# Patient Record
Sex: MALE | Race: BLACK OR AFRICAN AMERICAN
[De-identification: names, ages, dates, MRNs, and addresses within clinical notes are randomized per-mention and may not be internally consistent; named-entity substitution may affect disease eponyms.]

---

## 2022-06-21 ENCOUNTER — HOSPITAL ENCOUNTER (EMERGENCY)
Dept: HOSPITAL 97 - ER | Age: 57
Discharge: HOME | End: 2022-06-21
Payer: SELF-PAY

## 2022-06-21 DIAGNOSIS — I10: ICD-10-CM

## 2022-06-21 DIAGNOSIS — K21.9: ICD-10-CM

## 2022-06-21 DIAGNOSIS — F17.210: ICD-10-CM

## 2022-06-21 DIAGNOSIS — Z88.0: ICD-10-CM

## 2022-06-21 DIAGNOSIS — D50.9: Primary | ICD-10-CM

## 2022-06-21 LAB
ALBUMIN SERPL BCP-MCNC: 3.6 G/DL (ref 3.4–5)
ALP SERPL-CCNC: 59 U/L (ref 45–117)
ALT SERPL W P-5'-P-CCNC: 30 U/L (ref 12–78)
ANISOCYTOSIS BLD QL: (no result)
AST SERPL W P-5'-P-CCNC: 27 U/L (ref 15–37)
BUN BLD-MCNC: 13 MG/DL (ref 7–18)
FERRITIN SERPL-MCNC: 3.3 NG/ML (ref 26–388)
GLUCOSE SERPLBLD-MCNC: 95 MG/DL (ref 74–106)
HCT VFR BLD CALC: 23 % (ref 39.6–49)
HYPOCHROMIA BLD QL: (no result)
INR BLD: 1.08
IRON SERPL-MCNC: 14 UG/DL (ref 65–175)
LYMPHOCYTES # SPEC AUTO: 3.1 K/UL (ref 0.7–4.9)
MORPHOLOGY BLD-IMP: (no result)
PMV BLD: 8.3 FL (ref 7.6–11.3)
POTASSIUM SERPL-SCNC: 3.7 MMOL/L (ref 3.5–5.1)
RBC # BLD: 3.99 M/UL (ref 4.33–5.43)
RBC # BLD: 4.02 M/UL (ref 4.33–5.43)
TRANSFERRIN SERPL-MCNC: 296 MG/DL (ref 200–360)
TROPONIN I SERPL HS-MCNC: 8.8 PG/ML (ref ?–58.9)

## 2022-06-21 PROCEDURE — 85025 COMPLETE CBC W/AUTO DIFF WBC: CPT

## 2022-06-21 PROCEDURE — 84466 ASSAY OF TRANSFERRIN: CPT

## 2022-06-21 PROCEDURE — 86901 BLOOD TYPING SEROLOGIC RH(D): CPT

## 2022-06-21 PROCEDURE — 84484 ASSAY OF TROPONIN QUANT: CPT

## 2022-06-21 PROCEDURE — 83540 ASSAY OF IRON: CPT

## 2022-06-21 PROCEDURE — 93005 ELECTROCARDIOGRAM TRACING: CPT

## 2022-06-21 PROCEDURE — 85044 MANUAL RETICULOCYTE COUNT: CPT

## 2022-06-21 PROCEDURE — 80053 COMPREHEN METABOLIC PANEL: CPT

## 2022-06-21 PROCEDURE — 36415 COLL VENOUS BLD VENIPUNCTURE: CPT

## 2022-06-21 PROCEDURE — 82746 ASSAY OF FOLIC ACID SERUM: CPT

## 2022-06-21 PROCEDURE — 86850 RBC ANTIBODY SCREEN: CPT

## 2022-06-21 PROCEDURE — 86900 BLOOD TYPING SEROLOGIC ABO: CPT

## 2022-06-21 PROCEDURE — 82607 VITAMIN B-12: CPT

## 2022-06-21 PROCEDURE — 82272 OCCULT BLD FECES 1-3 TESTS: CPT

## 2022-06-21 PROCEDURE — 85610 PROTHROMBIN TIME: CPT

## 2022-06-21 PROCEDURE — 82728 ASSAY OF FERRITIN: CPT

## 2022-06-21 NOTE — XMS REPORT
Continuity of Care Document

                            Created on:2022



Patient:JUAN C HAMMOND

Sex:Male

:1965

External Reference #:[id number]





Demographics







                          Address                   [address]



                                                    [place] [address]

 

                          Home Phone                [phone]

 

                          Email Address             [email or web address]

 

                          Preferred Language        English

 

                          Marital Status            Unknown

 

                          Zoroastrianism Affiliation     Unknown

 

                          Race                      Unknown

 

                          Additional Race(s)        Unavailable

 

                          Ethnic Group              Unknown









Author







                          Organization              [facility]

t

 

                          Address                   [address]



                                                    [place] [address]

 

                          Phone                     [phone]









Support







                Name            Relationship    Address         Phone

 

                MARILEE CORONA              [address] [phone]



                                                [place] [address] 

 

                NADER HOWARD              Unavailable     [phone]









Care Team Providers







                    Name                Role                Phone

 

                    MADELYN  Attending Clinician Unavailable

 

                    Desktop             Attending Clinician Unavailable

 

                    Dawit Parisi          Attending Clinician [phone]

 

                    KWAN Howard         Attending Clinician Unavailable

 

                    MARIELENA JEFFRIES      Attending Clinician Unavailable

 

                    BÁRBARA             Attending Clinician Unavailable









Payers







           Payer Name Policy Type Policy Number Effective Date Expiration Date S

ource







Problems







       Condition Condition Condition Status Onset  Resolution Last   Treating Co

mments 

Source



       Name   Details Category        Date   Date   Treatment Clinician        



                                                 Date                 

 

       Lumbar Lumbar Problem Active                                    [facility]



       pain   pain                                                    [facility]ici



                                                                      ans

 

       T11    T11    Problem Active                                    [facility]



       vertebral vertebral                                                  Phys

ici



       fracture fracture                                                  ans

 

       Open   Open   Problem Active                                    [facility]



       fracture fracture                                                  Physic

i



       of     of                                                      ans



       eleventh eleventh                                                  



       thoracic thoracic                                                  



       vertebra vertebra                                                  



       with   with                                                    



       routine routine                                                  



       healing, healing,                                                  



       unspecifie unspecifie                                                  



       d fracture d fracture                                                  



       morphology morphology                                                  



       ,      ,                                                       



       subsequent subsequent                                                  



       encounter encounter                                                  

 

       Closed Closed Problem Active                                    [facility]



       compressio compressio                                                  Ph

ysici



       n fracture n fracture                                                  an

s



       of L1  of L1                                                   



       lumbar lumbar                                                  



       vertebra vertebra                                                  



       with   with                                                    



       routine routine                                                  



       healing, healing,                                                  



       subsequent subsequent                                                  



       encounter encounter                                                  







Allergies, Adverse Reactions, Alerts

This patient has no known allergies or adverse reactions.



Social History







           Social Habit Start Date Stop Date  Quantity   Comments   Source

 

           time of call 2020 1:12            [facility] 

[facility]



                      13:12:44   13:12:44   PM                    [facility]







Medications







       Ordered Filled Start  Stop   Current Ordering Indication Dosage Frequency

 Signature

                    Comments            Components          Source



     Medication Medication Date Date Medication? Clinician                (SIG) 

          



     Name Name                                                   

 

     Duexis Duexis 2019-0      Yes  HEATHER      1    Q0.3333D TAKE 1           [facility]



     800-26.6 -26.6 MG 5-22           PRASARN                TABLET 3     

      Physici



     Oral Tablet Oral Tablet 00:00:           M.D.                TIMES         

  ans



               00                                 DAILY           

 

     Cyclobenzap Cyclobenzap 2019-0      Yes  HEATHER      1    Q8H  TAKE 1        

   [facility]



     rine HCl - rine HCl - 1-02           PRASARN                TABLET         

  Physici



     10 MG Oral 10 MG Oral 00:00:           M.D.                EVERY 8         

  ans



     Tablet Tablet 00                                 HOURS PRN           



                                                  muscle           



                                                  spasm           







Procedures







                Procedure       Date / Time Performed Performing Clinician [facility]

e

 

                [U] XRAY THORACOLUMBAR SPINE 2019 00:00:00                

 [facility]



                AP AND LAT. [id number]                                 

 

                [U] XRAY THORACOLUMBAR SPINE 2019 00:00:00                

 [facility]



                AP AND LAT. [id number]                                 

 

                [U] XRAY THORACOLUMBAR SPINE 2019 00:00:00                

 [facility]



                AP AND LAT. [id number]                                 

 

                [U] XRAY THORACOLUMBAR SPINE 2019 00:00:00                

 [facility]



                AP AND LAT. [id number]                                 

 

                Physical Therapy 2019 00:00:00                 [facility]

ns

 

                [U] XRAY THORACOLUMBAR SPINE 2018 00:00:00                

 [facility]



                AP AND LAT. [id number]                                 







Encounters







        Start   End     Encounter Admission Attending Care    Care    Encounter 

Source



        Date/Time Date/Time Type    Type    Clinicians Facility Department ID   

   

 

        2021-10-14         Outpatient                 [facility]     [id number]

 [facility]



        17:01:02                                                 [id number]   [facility]

 

        2020 Emergency E       [facility]    [facility]    750[address]

    [facility]



        17:25:00 21:47:00                 JENISE VENTURA                           

 

        2020 Office          Desktop, [facility] Triage Nurse [facility]     

[facility]  Encounter/ 

[facility]



        00:00:00 00:00:00 Visit           Kassandra Marinelli         [facility] 

[phone] [facility]



                                        Karie Howard [facility]  [address]

[address]  [facility]         Health



                                                        Contact         



                                                        Center          

 

        2020 Emergency E       MARI JEFFRIES    [facility]    7502   

 [facility]



        15:27:00 17:10:00                 NATHALY                          

 

        2019 Cash GRANGER [facility]     Orthopedics 58

[id number] [place]



        08:15:00 08:15:00 t;              ISAI ROMERO         at [facility]         

Ion GRANGER,                         Sports          ans



                        HEATHER, M.D.                         [facility]          

 

        2019 [facility]         [facility]AR[facility]UST     Orthopedics 50

[id number] [place]



        08:15:00 08:15:00 t;              ISAI ROMERO         at [facility]



                        trish GRANGER M.D.                                         

 

        2019 [facility]     Orthopedics 50

[id number] [place]



        07:30:00 07:30:00 t;              [facility]                                         

 

        2019 [facility]         [facility]     Orthopedics 48

[id number] [place]



        13:45:00 13:45:00 t;              ISA[facility] ROMERO         [facility]                                         

 

        2019 [facility]         [facility]     Orthopedics 47

[id number] [place]



        10:15:00 10:15:00 t;              ISAI ROMERO         [facility]                                         

 

        2018 [facility]     [id number]

35 [place]



        09:30:00 09:30:00 t;              ISAI ROMERO                         [facility]



                        trish GRANGER M.D.                                         

 

        2018 Emergency E               [facility]    [facility]    7501    

[facility]



        13:35:00 13:35:00                                                 







Results







           Test Description Test Time  Test Comments Results    Result Comments 

Source









                    CBC W/AUTO DIFF WITH PLATELETS 2022 08:35:36 









                      Test Item  Value      Reference Range Interpretation Comme

nts









             WBC (test code = 1001) 6.1 K/UL     3.5-11.0                  

 

             RBC (test code = 1002) 4.20 M/UL    4.50-6.10    L            

 

             HEMOGLOBIN (test code = 6.8 G/DL     13.5-17.0    LL           



             1003)                                               RESULTS RECHECK

ED AND



                                                                 VERIFIED

 

             HEMATOCRIT (test code = 26.1 %       40.0-51.0    L            



             1004)                                               

 

             MCV (test code = 1005) 62.1 fL      80.0-99.0    L            

 

             MCH (test code = 1006) 16.2 PG      25.0-33.0    L            

 

             MCHC (test code = 1007) 26.1 G/DL    31.0-36.0    L            

 

             RDW (test code = 1038) 21.8 %       11.5-15.0    H            

 

             NEUTROPHILS (test code = 52.5 %                                    

         AUTOMATED



             1008)                                               DIFFERENTIAL CO

NFIRMED



                                                                      WITH FRANCINE ESCALONA SLIDE



                                                                 REVIEW.

 

             LYMPHOCYTES (test code = 32.7 %                                 



             1010)                                               

 

             MONOCYTES (test code = 10.8 %                                 



             1011)                                               

 

             EOSINOPHILS (test code = 2.6 %                                  



             1012)                                               

 

             BASOPHILS (test code = 1.1 %                                  



             1013)                                               

 

             IMMATURE GRANULOCYTES 0.3 %                                  



             (test code = 1036)                                        

 

             NUCLEATED RBCS (test code 0.0  /100 WBC'S See_Comment              

  [Automated message] The



             = 1065)                                             system which ge

nerated this



                                                                 result transmit

netta reference



                                                                 range: 0.0. The

 reference



                                                                 range was not u

sed to



                                                                 interpret this 

result as



                                                                 normal/abnormal

.

 

             PLATELET COUNT (test code 281 K/UL     130-400                   



             = 1015)                                             

 

             ABSOLUTE NEUTROPHILS (test 3.19 K/UL    1.50-7.50                 



             code = 1066)                                        

 

             ABSOLUTE LYMPHOCYTES (test 1.99 K/UL    1.00-4.00                 



             code = 1067)                                        

 

             ABSOLUTE MONOCYTES (test 0.66 K/UL    0.20-1.00                 



             code = 1068)                                        

 

             ABSOLUTE EOSINOPHILS (test 0.16 K/UL    0.00-0.50                 



             code = 1040)                                        

 

             ABSOLUTE BASOPHILS (test 0.07 K/UL    0.00-0.20                 



             code = 1069)                                        

 

             ABS IMMATURE GRANULOCYTES 0.02 K/UL    0.00-0.10                 



             (test code = 1020)                                        

 

             ABS NUCLEATED RBCS (test 0.00 K/UL    0.00-0.11                 



             code = 06672)                                        

 

             COMMENTS (test code = (NOTE)                                       

      MODERATE



             1016)                                               ANISOCYTOSIS   

         FEW



                                                                 ELLIPTOCYTES



                                                                 MARKED HYPOCHRO

MASIA



                                                                    MARKED MICRO

CYTOSIS



                                                                      SLIGHT POI

KILOCYTOSIS



                                                                          SLIGHT



                                                                 POLYCHROMASIA



                                                                 FEW TEAR DROP C

ELLS



                                                                               P

LATELETS



                                                                 APPEAR NORMAL  

     UNLESS



                                                                 OTHERWISE INDIC

ATED, ALL



                                                                 TESTING PERFORM

ED ATCLINICAL



                                                                 PATHOLOGY [employer]



                                                                  [address] [facility], [place]



                                                                 [address]          

LABORATORY



                                                                 DIRECTOR:  HEATHER HERNANDEZ M.D.

      CLIA



                                                                 NUMBER [id number]

03  CAP



                                                                 ACCREDITATION N

O. [id number]



COMPREHENSIVE METABOLIC [id number] 04:11:42





             Test Item    Value        Reference Range Interpretation Comments

 

             GLUCOSE (test code = 99 MG/DL     70-99                     



             2217)                                               

 

             BUN (test code = 13 MG/DL     6-20                      



             )                                               

 

             CREATININE (test 0.95 MG/DL   0.80-1.40                 



             code = )                                        

 

             eGFR ( CKD-EPI) 93 ML/MIN/1.73 >60                       



             (test code = 19082)                                        

 

             CALC BUN/CREAT (test 14 RATIO     6-28                      



             code = 2235)                                        

 

             SODIUM (test code = 140 MEQ/L    133-146                   



             )                                               

 

             POTASSIUM (test code 4.4 MEQ/L    3.5-5.4                   



             = )                                             

 

             CHLORIDE (test code 105 MEQ/L                        



             = )                                             

 

             CARBON DIOXIDE (test 25 MEQ/L     19-31                     



             code = )                                        

 

             CALCIUM (test code = 9.1 MG/DL    8.5-10.5                  



             )                                               

 

             PROTEIN, TOTAL (test 7.3 G/DL     6.1-8.3                   



             code = )                                        

 

             ALBUMIN (test code = 4.4 G/DL     3.5-5.2                   



             )                                               

 

             CALC GLOBULIN (test 2.9 G/DL     1.9-3.7                   



             code = )                                        

 

             CALC A/G RATIO (test 1.5 RATIO    1.0-2.6                   



             code = )                                        

 

             BILIRUBIN, TOTAL <0.2 MG/DL   See_Comment                [Automated

 message]



             (test code = )                                        The syste

m which



                                                                 generated this



                                                                 result transmit

netta



                                                                 reference range

:



                                                                 <=1.2. The refe

rence



                                                                 range was not u

sed



                                                                 to interpret th

is



                                                                 result as



                                                                 normal/abnormal

.

 

             ALKALINE PHOSPHATASE 69 U/L                           



             (test code = )                                        

 

             AST (test code = 31 U/L       9-50                      



             )                                               

 

             ALT (test code = 22 U/L       5-50                      



             )                                               



LIPID [id number] 04:11:42





             Test Item    Value        Reference Range Interpretation Comments

 

             CHOLESTEROL (test 151 MG/DL    <200                      



             code = 2210)                                        

 

             TRIGLYCERIDES (test 130 MG/DL    <150                      



             code = 2232)                                        

 

             HDL CHOLESTEROL (test 42 MG/DL     >39                       



             code = 2220)                                        

 

             CALC LDL CHOL (test 86 MG/DL     <100                       NOTE: C

ALCULATED LDL



             code = 2237)                                        IS BASED ON



                                                                 NOEL-WHATLEY 

METHOD



                                                                 WHICHINCLUDES



                                                                 ADJUSTABLE



                                                                 TRIGLYCERIDE:VL

DL



                                                                 CHOLESTEROL RAT

IO.THIS



                                                                 FACTOR VARIES B

Y



                                                                 MEASURED TRIGLY

CERIDE



                                                                 AND NON-HDLCHOL

ESTEROL



                                                                 CONCENTRATIONS 

WITH



                                                                 INCREASED CALCU

LATED



                                                                 LDL SEENIN HIGH

ER



                                                                 TRIGLYCERIDE OR

 LOWER



                                                                 NON-HDL SPECIME

NS. FOR



                                                                 MOREINFORMATION

, SEE



                                                                 CLIENT ANNOUNCE

MENT AT



                                                                 [email or web address]

[email or web address]



                                                                 /[email or web address]

 

             RISK RATIO LDL/HDL 2.05 RATIO   <3.55                     



             (test code = 2238)

## 2022-06-21 NOTE — EDPHYS
Physician Documentation                                                                           

 [facility]                                                                 

Name: Bayron Franklin                                                                              

Age: 56 yrs                                                                                       

Sex: Male                                                                                         

: 1965                                                                                   

MRN: [id number]                                                                                   

Arrival Date: 2022                                                                          

Time: 15:01                                                                                       

Account#: [id number]                                                                            

Bed 13                                                                                            

Private MD:                                                                                       

Wes Quiroga                                                                      

HPI:                                                                                              

                                                                                             

15:15 This 56 yrs old Black Male presents to ER via Ambulatory with complaints of Abnormal    jh7 

      Lab Results.                                                                                

15:15 Onset: The symptoms/episode began/occurred today. Patient presents for abnormal lab     jh7 

      results. States that he went to a rehabilitation center today that offered blood work.      

      He was told that his hemoglobin was 6.8 and that he needed to go to the ER. States that     

      he noticed that he has been craving ice for the past 2 years. States that he felt a         

      little fatigued and dizzy today after working out. Denies vomiting blood, black stool,      

      trauma, or abdominal pain. States that he was on iron a long time ago but that it           

      caused constipation and he stopped taking it. Denies any symptoms at this time..            

                                                                                                  

Historical:                                                                                       

- Allergies:                                                                                      

15:09 PENICILLINS;                                                                            vg1 

- Home Meds:                                                                                      

15:09 omeprazole Oral [Active];                                                               vg1 

- PMHx:                                                                                           

15:09 Acid Reflux;                                                                            vg1 

- PSHx:                                                                                           

15:09 Spinal Fusion;                                                                          vg1 

                                                                                                  

- Immunization history:: Client reports receiving the 2nd dose of the Covid vaccine.              

- Social history:: Smoking status: Patient reports the use of cigarette tobacco                   

  products, smokes one-half pack cigarettes per day.                                              

                                                                                                  

                                                                                                  

ROS:                                                                                              

15:15 Constitutional: Negative for fever, chills, and weight loss, ENT: Negative for injury,  jh7 

      pain, and discharge, Neck: Negative for injury, pain, and swelling, Cardiovascular:         

      Negative for chest pain, palpitations, and edema, Respiratory: Negative for shortness       

      of breath, cough, wheezing, and pleuritic chest pain, Abdomen/GI: Negative for              

      abdominal pain, nausea, vomiting, diarrhea, and constipation, Back: Negative for injury     

      and pain, Skin: Negative for injury, rash, and discoloration, Neuro: Negative for           

      headache, weakness, numbness, tingling, and seizure.                                        

15:15 All other systems are negative.                                                             

                                                                                                  

Exam:                                                                                             

15:15 Constitutional:  This is a well developed, well nourished patient who is awake, alert,  jh7 

      and in no acute distress. Eyes:  Pupils equal round and reactive to light, extra-ocular     

      motions intact.  Lids and lashes normal.  Conjunctiva and sclera are non-icteric and        

      not injected.  Cornea within normal limits.  Periorbital areas with no swelling,            

      redness, or edema. ENT:  Nares patent. No nasal discharge, no septal abnormalities          

      noted.  Oropharynx with no redness, swelling, or masses, exudates, or evidence of           

      obstruction, uvula midline.  Mucous membranes moist. Cardiovascular:  Regular rate and      

      rhythm with a normal S1 and S2.  No gallops, murmurs, or rubs.  Normal PMI, no JVD.  No     

      pulse deficits. Respiratory:  Lungs have equal breath sounds bilaterally, clear to          

      auscultation and percussion.  No rales, rhonchi or wheezes noted.  No increased work of     

      breathing, no retractions or nasal flaring. Abdomen/GI:  Soft, non-tender, with normal      

      bowel sounds.  No distension or tympany.  No guarding or rebound.  No evidence of           

      tenderness throughout. Skin:  Warm, dry with normal turgor.  Normal color with no           

      rashes, no lesions, and no evidence of cellulitis. Neuro:  Awake and alert, GCS 15,         

      oriented to person, place, time, and situation.   Motor strength 5/5 in all                 

      extremities.  Sensory grossly intact.  Normal gait.                                         

                                                                                                  

Vital Signs:                                                                                      

15:07  / 82; Pulse 88; Resp 16; Temp 99.1(TE); Pulse Ox 100% on R/A; Weight 95.25 kg;   vg1 

      Height 5 ft. 7 in. (170.18 cm); Pain 0/10;                                                  

16:45  / 76; Pulse 63; Resp 19; Pulse Ox 100% on R/A;                                   ww  

17:45  / 76; Pulse 64; Resp 20; Pulse Ox 99% on R/A;                                    ww  

18:30  / 85; Pulse 62; Resp 22; Pulse Ox 100% on R/A;                                   ww  

15:07 Body Mass Index 32.89 (95.25 kg, 170.18 cm)                                             1 

                                                                                                  

MDM:                                                                                              

15:49 Patient medically screened.                                                             [facility] 

19:46 Differential diagnosis: Microcytic hypochromic anemia, GI bleed. Data reviewed: vital   [facility] 

      signs, nurses notes, lab test result(s), EKG. Data interpreted: Pulse oximetry: is 100      

      %. Interpretation: normal. Counseling: I had a detailed discussion with the patient         

      and/or guardian regarding: the historical points, exam findings, and any diagnostic         

      results supporting the discharge/admit diagnosis, the need for outpatient follow up,        

      Hematology. ED course: The patient remained asymptomatic and in no distress throughout      

      his ER stay. Negative stool occult, abdomen soft and nontender, and no other signs of       

      an active GI bleed. He stated that he had been craving ice for 2 years, and 3 years ago     

      he could not donate plasma because his iron was too low. Informed him that we would         

      transfuse 1 unit of blood, and we would start him on iron supplements with stool            

      softeners. Also advised him to follow-up with a hematologist outpatient. If he develops     

      new concerning symptoms, he is to return to the ER immediately for eval..                   

20:20 Transition of care: After a detail discussion of the patient's case, care is            [facility] 

      transferred to Osmin Hernandez MD.                                                            

                                                                                                  

                                                                                             

15:16 Order name: CBC with Diff; Complete Time: 17:14                                         [facility] 

                                                                                             

15:16 Order name: CMP; Complete Time: 17:14                                                   [facility] 

                                                                                             

15:16 Order name: PT-INR; Complete Time: 16:40                                                [facility] 

                                                                                             

15:16 Order name: Type And Screen                                                             [facility] 

                                                                                             

15:16 Order name: Troponin High Sensitivity; Complete Time: 17:14                             [facility] 

                                                                                             

16:58 Order name: Manual Differential                                                         [facility]

                                                                                             

17:18 Order name: TIBC; Complete Time: 19:10                                                  [facility] 

                                                                                             

17:18 Order name: Ferritin; Complete Time: 19:10                                              [facility] 

                                                                                             

17:18 Order name: B12; Complete Time: 19:10                                                   [facility] 

                                                                                             

17:18 Order name: Folic Acid,Serum (folate); Complete Time: 19:10                             [facility] 

                                                                                             

17:18 Order name: Retic Count; Complete Time: 18:20                                           [facility] 

                                                                                             

18:08 Order name: Packed RBCs (Additional Unit)                                               [facility]

                                                                                             

19:10 Order name: Guiac                                                                       [facility] 

                                                                                             

15:16 Order name: IV Saline Lock; Complete Time: 16:21                                        [facility] 

                                                                                             

15:16 Order name: Labs collected and sent; Complete Time: 16:21                               [facility] 

                                                                                             

15:16 Order name: EKG; Complete Time: 15:16                                                   [facility] 

                                                                                             

18:00 Order name: Transfuse; Complete Time: 21:06                                             [facility] 

                                                                                             

20:14 Order name: ABO/RH no charge; Complete Time: 20:21                                      [facility]

                                                                                                  

EC:19 Rate is 74 beats/min. Rhythm is regular. QRS is negative in leads II, aVF, V1. AL       [facility] 

      interval is normal. QRS interval is normal. QT interval is normal. T waves are Normal.      

      No ST changes noted. Clinical impression: No evidence of ischemia.                          

                                                                                                  

Administered Medications:                                                                         

17:17 Drug: NS 0.9% 1000 ml Route: IV; Rate: 1 bolus; Site: left antecubital;                   

                                                                                                  

                                                                                                  

Disposition Summary:                                                                              

22 20:17                                                                                    

Discharge Ordered                                                                                 

      Location: Home                                                                          [facility] 

      Problem: new                                                                            [facility] 

      Symptoms: are unchanged                                                                 [facility] 

      Condition: Stable                                                                       [facility] 

      Diagnosis                                                                                   

        - Iron deficiency anemia, unspecified                                                 [facility] 

      Followup:                                                                               [facility] 

        - With: Private Physician                                                                  

        - When: 2 - 3 days                                                                         

        - Reason: Further diagnostic work-up                                                       

      Discharge Instructions:                                                                     

        - Discharge Summary Sheet                                                             [facility] 

        - Iron Deficiency Anemia, Adult                                                       [facility] 

        - Iron-Rich Diet                                                                      [facility] 

        - Preventing Iron Deficiency Anemia, Adult                                            [facility] 

        - Iron Level and Total Iron-Binding Capacity Tests                                    [facility] 

      Forms:                                                                                      

        - Medication Reconciliation Form                                                      [facility] 

        - Thank You Letter                                                                    [facility] 

      Prescriptions:                                                                              

        - Colace 100 mg Oral Tablet                                                                

            - take 1 tablet by ORAL route every 12 hours; 14 tablet; Refills: 0, Product      [facility] 

      Selection Permitted                                                                         

        - Ferrous Sulfate 325 mg (65 mg Iron) Oral Tablet                                          

            - take 1 tablet by ORAL route every 8 hours; 90 tablet; Refills: 0, Product       [facility] 

      Selection Permitted                                                                         

Signatures:                                                                                       

Dispatcher [employer]                           Jeanne Ceja RN                    RN   1                                                  

Leti Harris RN                       RN   ww                                                   

Caty Faria FNP                   FNP  [facility]                                                  

                                                                                                  

Corrections: (The following items were deleted from the chart)                                    

15:59 15:15 Patient presents for abnormal lab results. States that he went to a               [facility] 

      rehabilitation center today that offered blood work. He was told that his hemoglobin        

      was 6.8 and that he needed to go to the ER. States that he noticed that he has been         

      craving ice for the past 2 years. States that he felt a little fatigued and dizzy today     

      after working out. Denies vomiting blood, black stool, trauma, or abdominal pain.           

      States that he was on iron a long time ago but that it caused constipation and he           

      stopped taking it.. jh7                                                                     

18:17 18:00 FERRITIN+C.LAB.[facility]AS[facility] ordered. EDMS                                                  [facility]

                                                                                                  

**************************************************************************************************

## 2022-06-22 VITALS — SYSTOLIC BLOOD PRESSURE: 131 MMHG | DIASTOLIC BLOOD PRESSURE: 85 MMHG | OXYGEN SATURATION: 100 %

## 2022-06-22 VITALS — TEMPERATURE: 99.1 F

## 2022-06-22 NOTE — EKG
Test Date:    2022-06-21               Test Time:    16:03:58

Technician:   NAHUN                                     

                                                     

MEASUREMENT RESULTS:                                       

Intervals:                                           

Rate:         74                                     

AZ:           170                                    

QRSD:         80                                     

QT:           386                                    

QTc:          428                                    

Axis:                                                

P:            67                                     

AZ:           170                                    

QRS:          -58                                    

T:            11                                     

                                                     

INTERPRETIVE STATEMENTS:                                       

                                                     

Normal sinus rhythm

Left anterior fascicular block

Abnormal ECG

No previous ECG available for comparison



Electronically Signed On 06-22-22 07:17:20 CDT by Mikel Pickard